# Patient Record
(demographics unavailable — no encounter records)

---

## 2025-01-29 NOTE — ASSESSMENT
[FreeTextEntry1] : The patient is a 91-year-old female with left foot rest pain.  I performed an arterial duplex that shows diffuse atherosclerotic disease present throughout the lower extremities bilaterally with significant tibial vessel disease present.  The patient has some discoloration to her left foot and tenderness to the first big toe.  I recommend she follow-up with podiatry for evaluation to rule out infection and osteomyelitis.  I will schedule the patient for a left lower extremity angiogram possible endovascular revascularization.  I have discussed this procedure with the patient and her daughter and they are in agreement.  I have spent 40 minutes with this patient performing physical exam, reviewing duplex results,  scan findings, discussing treatment plan and followup with this patient.  Thank you for allowing me to participate in the care of your patient.   Sincerely,  Eduardo Huber MD, RPVI, FACS Associate Professor of Surgery , Vascular Fellowship Director of Limb Salvage Surgery [facility] [employer] at [facility]

## 2025-01-29 NOTE — DATA REVIEWED
[FreeTextEntry1] : Arterial duplex right there is diffuse calcification with no evidence of hemodynamically significant disease in the lower extremity from the groin to the knee.  The common origin deep femoral superficial femoral popliteal arteries are patent.  The dorsalis pedis and posterior tibial arteries are diffusely calcified with diminished flow.  Left there is diffuse calcification with no evidence of hemodynamically significant disease in lower extremity from the groin to the knee.  The common origin deep femoral superficial femoral and popliteal arteries are patent.  [place] pedis and posterior tibial arteries are diffusely calcified with diminished flow

## 2025-01-29 NOTE — PHYSICAL EXAM
[2+] : left 2+ [0] : left 0 [] : bilaterally [Ankle Swelling On The Right] : mild [1+] : left 1+ [Ankle Swelling (On Exam)] : not present [Varicose Veins Of Lower Extremities] : not present [FreeTextEntry1] : Left foot discoloration at the first toe.  Positive tenderness to touch.

## 2025-01-29 NOTE — HISTORY OF PRESENT ILLNESS
[FreeTextEntry1] : The patient is a 91-year-old female who presents for evaluation of her lower extremity arterial hemodynamics.  The patient has a significant past medical history for aortic stenosis Alzheimer's atrial fibrillation cardiomyopathy hyperlipidemia hypertension hypothyroidism who presents today for evaluation of her lower extremity arterial hemodynamics.  The patient is limited in her ambulation.  She walks with a walker.  The patient has been complaining of some pain to her toes at night.  She has some discoloration to her lower extremities and was referred by podiatry for evaluation

## 2025-03-31 NOTE — DISCUSSION/SUMMARY
[FreeTextEntry1] : DIET DISCUSSED IN DETAIL CONTINUE ROSUVASTATIN FOR HYPERLIPIDEMIA  CONTINUE LISINOPRIL 5 mg DAILY FOR HYPERTENSION  CONTINUE METOPROLOL SUCCINATE ER 25mg DAILY  CONTINUE TORSEMIDE 20mg DAILY FOR VOLUME CONTROL STRUCTURAL HEART FOLLOWUP FOR SEVERE A.S. AND HEART FAILURE FOLLOWUP WITH PMD FOLLOWUP IN 3-4 MONTHS  [EKG obtained to assist in diagnosis and management of assessed problem(s)] : EKG obtained to assist in diagnosis and management of assessed problem(s)

## 2025-03-31 NOTE — PHYSICAL EXAM

## 2025-03-31 NOTE — HISTORY OF PRESENT ILLNESS
[TextEntry] : 92 y.o. female with a history of hypertension, hyperlipidemia, hypothyroidism and CKD. Seen by me in the past. Found to have severe Aortic Stenosis. Was seen by CTS but she refused TAVR. Being followed at [facility]. She is still deciding .in A-fib, on Eliquis.

## 2025-04-11 NOTE — HISTORY OF PRESENT ILLNESS
[de-identified] : 92 f for eval of knees had bl replacement both have been revised multiple times has a static fusion nail on left has revision on the right there are some radiographic signs of loosening small amount of remodeling around the femoral stem which appears stable will monitor she doesnt walk very much at this time, she is in a wheelchair there are no signs of active infection will f/u in 6 months for repeat x-rays.

## 2025-04-11 NOTE — HISTORY OF PRESENT ILLNESS
[de-identified] : 92 f for eval of knees had bl replacement both have been revised multiple times has a static fusion nail on left has revision on the right there are some radiographic signs of loosening small amount of remodeling around the femoral stem which appears stable will monitor she doesnt walk very much at this time, she is in a wheelchair there are no signs of active infection will f/u in 6 months for repeat x-rays.